# Patient Record
Sex: MALE | Race: WHITE
[De-identification: names, ages, dates, MRNs, and addresses within clinical notes are randomized per-mention and may not be internally consistent; named-entity substitution may affect disease eponyms.]

---

## 2018-03-13 ENCOUNTER — HOSPITAL ENCOUNTER (OUTPATIENT)
Dept: HOSPITAL 53 - M RAD | Age: 58
End: 2018-03-13
Attending: PHYSICIAN ASSISTANT
Payer: COMMERCIAL

## 2018-03-13 DIAGNOSIS — M25.571: Primary | ICD-10-CM

## 2018-07-05 ENCOUNTER — HOSPITAL ENCOUNTER (OUTPATIENT)
Dept: HOSPITAL 53 - M LAB REF | Age: 58
End: 2018-07-05
Attending: PHYSICIAN ASSISTANT
Payer: COMMERCIAL

## 2018-07-05 DIAGNOSIS — M79.673: Primary | ICD-10-CM

## 2018-07-05 LAB
ALBUMIN/GLOBULIN RATIO: 1.09 (ref 1–1.93)
ALBUMIN: 3.7 GM/DL (ref 3.2–5.2)
ALKALINE PHOSPHATASE: 130 U/L (ref 45–117)
ALT SERPL W P-5'-P-CCNC: 27 U/L (ref 12–78)
ANION GAP: 10 MEQ/L (ref 8–16)
AST SERPL-CCNC: 18 U/L (ref 7–37)
BASO #: 0 10^3/UL (ref 0–0.2)
BASO %: 0.5 % (ref 0–1)
BILIRUBIN,TOTAL: 0.8 MG/DL (ref 0.2–1)
BLOOD UREA NITROGEN: 19 MG/DL (ref 7–18)
CALCIUM LEVEL: 9.2 MG/DL (ref 8.5–10.1)
CARBON DIOXIDE LEVEL: 23 MEQ/L (ref 21–32)
CHLORIDE LEVEL: 109 MEQ/L (ref 98–107)
CREATININE FOR GFR: 1.48 MG/DL (ref 0.7–1.3)
EOS #: 0.3 10^3/UL (ref 0–0.5)
EOSINOPHIL NFR BLD AUTO: 3.4 % (ref 0–3)
GFR SERPL CREATININE-BSD FRML MDRD: 52.5 ML/MIN/{1.73_M2} (ref 56–?)
GLUCOSE, FASTING: 92 MG/DL (ref 70–100)
HEMATOCRIT: 40.6 % (ref 42–52)
HEMOGLOBIN: 14.2 G/DL (ref 13.5–17.5)
IMMATURE GRANULOCYTE %: 0.4 % (ref 0–3)
LYMPH #: 2.1 10^3/UL (ref 1.5–4.5)
LYMPH %: 24.8 % (ref 24–44)
MEAN CORPUSCULAR HEMOGLOBIN: 35.1 PG (ref 27–33)
MEAN CORPUSCULAR HGB CONC: 35 G/DL (ref 32–36.5)
MEAN CORPUSCULAR VOLUME: 100.5 FL (ref 80–96)
MONO #: 0.7 10^3/UL (ref 0–0.8)
MONO %: 8.5 % (ref 0–5)
NEUTROPHILS #: 5.2 10^3/UL (ref 1.8–7.7)
NEUTROPHILS %: 62.4 % (ref 36–66)
NRBC BLD AUTO-RTO: 0 % (ref 0–0)
PLATELET COUNT, AUTOMATED: 278 10^3/UL (ref 150–450)
POTASSIUM SERUM: 4.2 MEQ/L (ref 3.5–5.1)
RED BLOOD COUNT: 4.04 10^6/UL (ref 4.3–6.1)
RED CELL DISTRIBUTION WIDTH: 12.1 % (ref 11.5–14.5)
SODIUM LEVEL: 142 MEQ/L (ref 136–145)
TOTAL PROTEIN: 7.1 GM/DL (ref 6.4–8.2)
WHITE BLOOD COUNT: 8.3 10^3/UL (ref 4–10)

## 2018-07-11 ENCOUNTER — HOSPITAL ENCOUNTER (OUTPATIENT)
Dept: HOSPITAL 53 - M LAB REF | Age: 58
End: 2018-07-11
Attending: INTERNAL MEDICINE
Payer: COMMERCIAL

## 2018-07-11 DIAGNOSIS — R63.4: ICD-10-CM

## 2018-07-11 DIAGNOSIS — N18.3: Primary | ICD-10-CM

## 2018-07-11 DIAGNOSIS — M25.511: ICD-10-CM

## 2018-07-11 DIAGNOSIS — M79.671: ICD-10-CM

## 2018-07-11 DIAGNOSIS — K21.9: ICD-10-CM

## 2018-07-11 DIAGNOSIS — R73.09: ICD-10-CM

## 2018-07-11 LAB
APPEARANCE, URINE: CLEAR
BACTERIA UR QL AUTO: NEGATIVE
BILIRUBIN, URINE AUTO: NEGATIVE
BLOOD, URINE BLOOD: NEGATIVE
GLUCOSE, URINE (UA) AUTO: NEGATIVE MG/DL
KETONE, URINE AUTO: NEGATIVE MG/DL
LEUKOCYTE ESTERASE UR QL STRIP.AUTO: NEGATIVE
MUCUS, URINE: (no result)
NITRITE, URINE AUTO: NEGATIVE
PH,URINE: 6 UNITS (ref 5–9)
PROT UR QL STRIP.AUTO: NEGATIVE MG/DL
RBC, URINE AUTO: 1 /HPF (ref 0–3)
SPECIFIC GRAVITY URINE AUTO: 1.02 (ref 1–1.03)
SQUAMOUS #/AREA URNS AUTO: 0 /HPF (ref 0–6)
URIC ACID: 7.3 MG/DL (ref 3.5–7.2)
UROBILINOGEN, URINE AUTO: 0.2 MG/DL (ref 0–2)
VITAMIN B12 LEVEL: 403 PG/ML (ref 247–911)
WBC, URINE AUTO: 1 /HPF (ref 0–3)

## 2018-07-11 PROCEDURE — 84550 ASSAY OF BLOOD/URIC ACID: CPT

## 2018-07-12 LAB
CRP SERPL-MCNC: 0.6 MG/DL (ref 0–0.3)
PHOSPHORUS LEVEL: 3.1 MG/DL (ref 2.5–4.9)
RHEUMATOID FACTOR QUANT: < 10 IU/ML (ref ?–15)

## 2018-07-30 ENCOUNTER — HOSPITAL ENCOUNTER (OUTPATIENT)
Dept: HOSPITAL 53 - M LAB REF | Age: 58
End: 2018-07-30
Attending: INTERNAL MEDICINE
Payer: COMMERCIAL

## 2018-07-30 DIAGNOSIS — M25.511: ICD-10-CM

## 2018-07-30 DIAGNOSIS — M79.671: Primary | ICD-10-CM

## 2018-07-30 LAB
CRP SERPL-MCNC: 0.33 MG/DL (ref 0–0.3)
URIC ACID: 7.9 MG/DL (ref 3.5–7.2)

## 2018-07-30 PROCEDURE — 84550 ASSAY OF BLOOD/URIC ACID: CPT

## 2018-09-05 ENCOUNTER — HOSPITAL ENCOUNTER (OUTPATIENT)
Dept: HOSPITAL 53 - M RAD | Age: 58
End: 2018-09-05
Attending: INTERNAL MEDICINE
Payer: COMMERCIAL

## 2018-09-05 DIAGNOSIS — N18.3: ICD-10-CM

## 2018-09-05 DIAGNOSIS — I15.0: Primary | ICD-10-CM

## 2018-09-05 PROCEDURE — 78707 K FLOW/FUNCT IMAGE W/O DRUG: CPT

## 2018-10-09 ENCOUNTER — HOSPITAL ENCOUNTER (OUTPATIENT)
Dept: HOSPITAL 53 - M LAB REF | Age: 58
End: 2018-10-09
Attending: INTERNAL MEDICINE
Payer: COMMERCIAL

## 2018-10-09 DIAGNOSIS — M79.671: Primary | ICD-10-CM

## 2018-10-09 DIAGNOSIS — M25.511: ICD-10-CM

## 2018-10-09 LAB — URIC ACID: 8.8 MG/DL (ref 3.5–7.2)

## 2018-10-09 PROCEDURE — 84550 ASSAY OF BLOOD/URIC ACID: CPT

## 2018-11-27 ENCOUNTER — HOSPITAL ENCOUNTER (OUTPATIENT)
Dept: HOSPITAL 53 - M LAB REF | Age: 58
End: 2018-11-27
Attending: INTERNAL MEDICINE
Payer: COMMERCIAL

## 2018-11-27 DIAGNOSIS — M79.671: ICD-10-CM

## 2018-11-27 DIAGNOSIS — M25.511: Primary | ICD-10-CM

## 2018-11-27 LAB — URIC ACID: 8.2 MG/DL (ref 3.5–7.2)

## 2018-11-27 PROCEDURE — 84550 ASSAY OF BLOOD/URIC ACID: CPT

## 2018-11-29 ENCOUNTER — HOSPITAL ENCOUNTER (OUTPATIENT)
Dept: HOSPITAL 53 - M RAD | Age: 58
End: 2018-11-29
Attending: INTERNAL MEDICINE
Payer: COMMERCIAL

## 2018-11-29 DIAGNOSIS — I70.1: Primary | ICD-10-CM

## 2019-02-23 ENCOUNTER — HOSPITAL ENCOUNTER (OUTPATIENT)
Dept: HOSPITAL 53 - M RAD | Age: 59
End: 2019-02-23
Attending: INTERNAL MEDICINE
Payer: COMMERCIAL

## 2019-02-23 DIAGNOSIS — M54.5: Primary | ICD-10-CM

## 2019-02-23 NOTE — REP
The lumbar spine five views:

Comparison is 12/24/2016.

There are pedicle screws and stabilization rods spanning the L4 - L5-S1 levels.

This is unchanged.

There is no spondylolisthesis.

There is opacity within the L4-5 disc space, possibly an intervertebral disc

spacer.

There is disc space narrowing throughout the lumbar spine, most advanced at L4-5

and L5 S1 compatible with multilevel degenerative disc disease, not significantly

changed.

The sacroiliac articulations are unremarkable.

Impression:

Postsurgical changes as described.

Degenerative disc disease as described.

No significant interval change.

 

 

Electronically Signed by

Attila Hussein MD 02/23/2019 10:56 A

## 2019-03-27 ENCOUNTER — HOSPITAL ENCOUNTER (OUTPATIENT)
Dept: HOSPITAL 53 - M LAB REF | Age: 59
End: 2019-03-27
Attending: INTERNAL MEDICINE
Payer: COMMERCIAL

## 2019-03-27 DIAGNOSIS — M25.511: ICD-10-CM

## 2019-03-27 DIAGNOSIS — M79.671: Primary | ICD-10-CM

## 2019-04-30 ENCOUNTER — HOSPITAL ENCOUNTER (OUTPATIENT)
Dept: HOSPITAL 53 - M SMT | Age: 59
End: 2019-04-30
Attending: INTERNAL MEDICINE

## 2019-04-30 DIAGNOSIS — Z98.1: ICD-10-CM

## 2019-04-30 DIAGNOSIS — M51.9: Primary | ICD-10-CM

## 2019-05-01 NOTE — REP
Clinical:  Pain and disability.

 

Technique:  AP, lateral, coned-down views of the lumbosacral spine.

 

Comparison:  02/23/2019.

 

Findings:

The patient is again noted to be status post posterior fixation at L4-S1.

Alignment is stable and relatively maintained.  Multilevel degenerative changes

include endplate sclerosis, osteophyte formation, hypertrophic facet changes are

again noted and essentially stable. No acute fracture / compression injury or

subluxation.

 

Impression:

Stable appearance to L4-S1 fixation.

Multilevel degenerative changes again noted and relatively stable.

 

 

 

 

Electronically Signed by

Cheng Luna MD 05/01/2019 01:21 A

## 2019-07-22 ENCOUNTER — HOSPITAL ENCOUNTER (OUTPATIENT)
Dept: HOSPITAL 53 - M LAB REF | Age: 59
End: 2019-07-22
Attending: INTERNAL MEDICINE
Payer: COMMERCIAL

## 2019-07-22 DIAGNOSIS — M79.671: Primary | ICD-10-CM

## 2019-11-11 ENCOUNTER — HOSPITAL ENCOUNTER (OUTPATIENT)
Dept: HOSPITAL 53 - M LAB REF | Age: 59
End: 2019-11-11
Attending: INTERNAL MEDICINE
Payer: COMMERCIAL

## 2019-11-11 DIAGNOSIS — M10.9: Primary | ICD-10-CM

## 2020-03-03 ENCOUNTER — HOSPITAL ENCOUNTER (OUTPATIENT)
Dept: HOSPITAL 53 - M LAB REF | Age: 60
End: 2020-03-03
Attending: INTERNAL MEDICINE
Payer: MEDICARE

## 2020-03-03 DIAGNOSIS — M10.9: Primary | ICD-10-CM

## 2020-03-03 DIAGNOSIS — N18.3: ICD-10-CM

## 2020-09-08 ENCOUNTER — HOSPITAL ENCOUNTER (OUTPATIENT)
Dept: HOSPITAL 53 - M LAB REF | Age: 60
End: 2020-09-08
Attending: INTERNAL MEDICINE
Payer: MEDICARE

## 2020-09-08 DIAGNOSIS — M10.9: Primary | ICD-10-CM

## 2020-12-23 ENCOUNTER — HOSPITAL ENCOUNTER (OUTPATIENT)
Dept: HOSPITAL 53 - M WUC | Age: 60
End: 2020-12-23
Attending: PHYSICIAN ASSISTANT
Payer: MEDICARE

## 2020-12-23 DIAGNOSIS — S93.602A: ICD-10-CM
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
## 2020-12-23 DIAGNOSIS — S93.402A: Primary | ICD-10-CM

## 2020-12-23 DIAGNOSIS — M19.072: ICD-10-CM

## 2020-12-23 NOTE — REP
INDICATION:

SPRAIN OF LEFT ANKLE AND FOOT



COMPARISON:

None.



TECHNIQUE:

AP, lateral, bilateral oblique views.



FINDINGS:

Generalized age-related changes are appreciated as well as evidence for old healed

fractures primarily involving the lateral malleolus.  No obvious acute fracture or

dislocation identified.  Ankle mortise appears intact.



IMPRESSION:

Generalized age-related degenerative changes and posttraumatic changes related to old

injury.  No obvious acute fracture or dislocation.





<Electronically signed by Cheng Luna > 12/23/20 0970

## 2020-12-23 NOTE — REP
INDICATION:

SPRAIN OF LEFT ANKLE AND FOOT



COMPARISON:

None.



TECHNIQUE:

AP, lateral, bilateral oblique views left foot.



FINDINGS:

Generalized age-related changes are appreciated.  No obvious acute fracture or

dislocation.  No subcutaneous emphysema or foreign body.



IMPRESSION:

Generalized age-related changes.  No acute fracture or dislocation.





<Electronically signed by Cheng Luna > 12/23/20 0920

## 2021-03-09 ENCOUNTER — HOSPITAL ENCOUNTER (OUTPATIENT)
Dept: HOSPITAL 53 - M LAB REF | Age: 61
End: 2021-03-09
Attending: INTERNAL MEDICINE
Payer: MEDICARE

## 2021-03-09 DIAGNOSIS — M10.9: Primary | ICD-10-CM

## 2021-04-21 ENCOUNTER — HOSPITAL ENCOUNTER (OUTPATIENT)
Dept: HOSPITAL 53 - M LAB REF | Age: 61
End: 2021-04-21
Attending: INTERNAL MEDICINE
Payer: MEDICARE

## 2021-04-21 DIAGNOSIS — M10.9: Primary | ICD-10-CM

## 2021-07-03 ENCOUNTER — HOSPITAL ENCOUNTER (EMERGENCY)
Dept: HOSPITAL 53 - M ED | Age: 61
LOS: 1 days | Discharge: HOME | End: 2021-07-04
Payer: MEDICARE

## 2021-07-03 VITALS — SYSTOLIC BLOOD PRESSURE: 152 MMHG | DIASTOLIC BLOOD PRESSURE: 81 MMHG

## 2021-07-03 VITALS — WEIGHT: 250.53 LBS | HEIGHT: 75 IN | BODY MASS INDEX: 31.15 KG/M2

## 2021-07-03 DIAGNOSIS — E78.5: ICD-10-CM

## 2021-07-03 DIAGNOSIS — R07.9: Primary | ICD-10-CM

## 2021-07-03 DIAGNOSIS — Z79.899: ICD-10-CM

## 2021-07-03 DIAGNOSIS — I10: ICD-10-CM

## 2021-07-03 DIAGNOSIS — R06.02: ICD-10-CM

## 2021-07-03 DIAGNOSIS — Z87.891: ICD-10-CM

## 2021-07-03 LAB
ALBUMIN SERPL BCG-MCNC: 3 GM/DL (ref 3.2–5.2)
ALT SERPL W P-5'-P-CCNC: 41 U/L (ref 12–78)
APTT BLD: 23.2 SECONDS (ref 24.2–38.5)
BASOPHILS # BLD AUTO: 0.1 10^3/UL (ref 0–0.2)
BASOPHILS NFR BLD AUTO: 0.6 % (ref 0–1)
BILIRUB CONJ SERPL-MCNC: 0.2 MG/DL (ref 0–0.2)
BILIRUB SERPL-MCNC: 0.6 MG/DL (ref 0.2–1)
BUN SERPL-MCNC: 19 MG/DL (ref 7–18)
CALCIUM SERPL-MCNC: 7 MG/DL (ref 8.5–10.1)
CHLORIDE SERPL-SCNC: 116 MEQ/L (ref 98–107)
CK MB CFR.DF SERPL CALC: 1.33
CK MB SERPL-MCNC: 2.8 NG/ML (ref ?–3.6)
CK SERPL-CCNC: 210 U/L (ref 39–308)
CO2 SERPL-SCNC: 20 MEQ/L (ref 21–32)
CREAT SERPL-MCNC: 1.33 MG/DL (ref 0.7–1.3)
EOSINOPHIL # BLD AUTO: 0.3 10^3/UL (ref 0–0.5)
EOSINOPHIL NFR BLD AUTO: 3 % (ref 0–3)
GFR SERPL CREATININE-BSD FRML MDRD: 58.8 ML/MIN/{1.73_M2} (ref 56–?)
GLUCOSE SERPL-MCNC: 98 MG/DL (ref 70–100)
HCT VFR BLD AUTO: 46.2 % (ref 42–52)
HGB BLD-MCNC: 15.9 G/DL (ref 13.5–17.5)
INR PPP: 0.97
LIPASE SERPL-CCNC: 108 U/L (ref 73–393)
LYMPHOCYTES # BLD AUTO: 3.5 10^3/UL (ref 1.5–5)
LYMPHOCYTES NFR BLD AUTO: 32.7 % (ref 24–44)
MCH RBC QN AUTO: 34.2 PG (ref 27–33)
MCHC RBC AUTO-ENTMCNC: 34.4 G/DL (ref 32–36.5)
MCV RBC AUTO: 99.4 FL (ref 80–96)
MONOCYTES # BLD AUTO: 0.9 10^3/UL (ref 0–0.8)
MONOCYTES NFR BLD AUTO: 8 % (ref 2–8)
NEUTROPHILS # BLD AUTO: 6 10^3/UL (ref 1.5–8.5)
NEUTROPHILS NFR BLD AUTO: 55.3 % (ref 36–66)
NT-PRO BNP: 11 PG/ML (ref ?–125)
PLATELET # BLD AUTO: 285 10^3/UL (ref 150–450)
POTASSIUM SERPL-SCNC: 3.4 MEQ/L (ref 3.5–5.1)
PROT SERPL-MCNC: 5.6 GM/DL (ref 6.4–8.2)
PROTHROMBIN TIME: 13.1 SECONDS (ref 12.5–14.3)
RBC # BLD AUTO: 4.65 10^6/UL (ref 4.3–6.1)
SODIUM SERPL-SCNC: 146 MEQ/L (ref 136–145)
T4 FREE SERPL-MCNC: 0.85 NG/DL (ref 0.76–1.46)
TROPONIN I SERPL-MCNC: < 0.02 NG/ML (ref ?–0.1)
TSH SERPL DL<=0.005 MIU/L-ACNC: 1.92 UIU/ML (ref 0.36–3.74)
WBC # BLD AUTO: 10.8 10^3/UL (ref 4–10)

## 2021-07-03 PROCEDURE — 96361 HYDRATE IV INFUSION ADD-ON: CPT

## 2021-07-03 PROCEDURE — 85025 COMPLETE CBC W/AUTO DIFF WBC: CPT

## 2021-07-03 PROCEDURE — 84443 ASSAY THYROID STIM HORMONE: CPT

## 2021-07-03 PROCEDURE — 71275 CT ANGIOGRAPHY CHEST: CPT

## 2021-07-03 PROCEDURE — 82550 ASSAY OF CK (CPK): CPT

## 2021-07-03 PROCEDURE — 84439 ASSAY OF FREE THYROXINE: CPT

## 2021-07-03 PROCEDURE — 71045 X-RAY EXAM CHEST 1 VIEW: CPT

## 2021-07-03 PROCEDURE — 99285 EMERGENCY DEPT VISIT HI MDM: CPT

## 2021-07-03 PROCEDURE — 80076 HEPATIC FUNCTION PANEL: CPT

## 2021-07-03 PROCEDURE — 85610 PROTHROMBIN TIME: CPT

## 2021-07-03 PROCEDURE — 93041 RHYTHM ECG TRACING: CPT

## 2021-07-03 PROCEDURE — 82553 CREATINE MB FRACTION: CPT

## 2021-07-03 PROCEDURE — 80048 BASIC METABOLIC PNL TOTAL CA: CPT

## 2021-07-03 PROCEDURE — 94760 N-INVAS EAR/PLS OXIMETRY 1: CPT

## 2021-07-03 PROCEDURE — 84484 ASSAY OF TROPONIN QUANT: CPT

## 2021-07-03 PROCEDURE — 96360 HYDRATION IV INFUSION INIT: CPT

## 2021-07-03 PROCEDURE — 85730 THROMBOPLASTIN TIME PARTIAL: CPT

## 2021-07-03 PROCEDURE — 83690 ASSAY OF LIPASE: CPT

## 2021-07-03 PROCEDURE — 93005 ELECTROCARDIOGRAM TRACING: CPT

## 2021-07-03 PROCEDURE — 83880 ASSAY OF NATRIURETIC PEPTIDE: CPT

## 2021-07-03 RX ADMIN — NITROGLYCERIN PRN MG: 0.4 TABLET SUBLINGUAL at 20:37

## 2021-07-03 NOTE — REP
INDICATION:

CHEST PAIN



COMPARISON:

None.



TECHNIQUE:

Portable AP view of the chest



FINDINGS:

The mediastinum and cardiac silhouette are within normal limits for portable

technique.  The lung fields are clear without acute consolidation, effusion, or

pneumothorax.  Skeletal structures are intact.



IMPRESSION:

No acute cardiopulmonary process appreciated.





<Electronically signed by Cheng Luna > 07/03/21 2029

## 2021-07-04 VITALS — DIASTOLIC BLOOD PRESSURE: 94 MMHG | SYSTOLIC BLOOD PRESSURE: 135 MMHG

## 2021-07-04 LAB
CK MB CFR.DF SERPL CALC: 1.58
CK MB SERPL-MCNC: 3.2 NG/ML (ref ?–3.6)
CK SERPL-CCNC: 202 U/L (ref 39–308)
TROPONIN I SERPL-MCNC: < 0.02 NG/ML (ref ?–0.1)

## 2021-07-04 RX ADMIN — NITROGLYCERIN PRN MG: 0.4 TABLET SUBLINGUAL at 04:53

## 2021-07-04 NOTE — ECGEPIP
[facility] - ED

                                       

                                       Test Date:    2021

Pat Name:     GOPI JOEL            Department:   

Patient ID:   [id number]                 Room:         -

Gender:       Male                     Technician:   ED

:          1960               Requested By: MORENA MERRILL

Order Number: [id number]     Reading MD:   Maja Lundborg-Gray

                                 Measurements

Intervals                              Axis          

Rate:         64                       P:            -1

MS:           182                      QRS:          -7

QRSD:         84                       T:            26

QT:           396                                    

QTc:          408                                    

                           Interpretive Statements

Normal sinus rhythm

Minimal voltage criteria for LVH, may be normal variant ( R in aVL )

leftward axis

Nonspecific ST T wave changes 

 

cw 7/3/21 rate decreased

Nonspecific ST T wave changes

Electronically Signed on 2021 18:12:20 EDT by Maja Lundborg-Gray

## 2021-07-04 NOTE — ECGEPIP
[facility] - ED

                                       

                                       Test Date:    2021

Pat Name:     GOPI JOEL            Department:   

Patient ID:   [id number]                 Room:         -

Gender:       Male                     Technician:   

:          1960               Requested By: MORENA MERRILL

Order Number: [id number]     Reading MD:   Maja Lundborg-Gray

                                 Measurements

Intervals                              Axis          

Rate:         83                       P:            35

CA:           150                      QRS:          -10

QRSD:         72                       T:            21

QT:           344                                    

QTc:          404                                    

                           Interpretive Statements

Normal sinus rhythm

leftward axis

Minimal voltage criteria for LVH, may be normal variant ( R in aVL )

Nonspecific T wave abnormality

No prior ECG for comparison

Electronically Signed on 2021 18:10:56 EDT by Maja Lundborg-Gray

## 2021-09-14 ENCOUNTER — HOSPITAL ENCOUNTER (OUTPATIENT)
Dept: HOSPITAL 53 - M LAB REF | Age: 61
End: 2021-09-14
Attending: INTERNAL MEDICINE
Payer: MEDICARE

## 2021-09-14 DIAGNOSIS — M10.9: Primary | ICD-10-CM

## 2022-10-11 ENCOUNTER — HOSPITAL ENCOUNTER (OUTPATIENT)
Dept: HOSPITAL 53 - M LAB REF | Age: 62
End: 2022-10-11
Attending: PHYSICIAN ASSISTANT
Payer: MEDICARE

## 2022-10-11 DIAGNOSIS — R30.0: ICD-10-CM

## 2022-10-11 DIAGNOSIS — M54.50: Primary | ICD-10-CM

## 2022-11-04 ENCOUNTER — HOSPITAL ENCOUNTER (OUTPATIENT)
Dept: HOSPITAL 53 - M RAD | Age: 62
End: 2022-11-04
Attending: PHYSICIAN ASSISTANT
Payer: MEDICARE

## 2022-11-04 DIAGNOSIS — N50.819: Primary | ICD-10-CM

## 2022-11-15 ENCOUNTER — HOSPITAL ENCOUNTER (OUTPATIENT)
Dept: HOSPITAL 53 - M LAB REF | Age: 62
End: 2022-11-15
Attending: INTERNAL MEDICINE
Payer: MEDICARE

## 2022-11-15 DIAGNOSIS — R74.8: Primary | ICD-10-CM

## 2023-05-11 ENCOUNTER — HOSPITAL ENCOUNTER (OUTPATIENT)
Dept: HOSPITAL 53 - M LAB REF | Age: 63
End: 2023-05-11
Attending: INTERNAL MEDICINE
Payer: MEDICARE

## 2023-05-11 DIAGNOSIS — M10.9: Primary | ICD-10-CM

## 2023-08-23 ENCOUNTER — HOSPITAL ENCOUNTER (EMERGENCY)
Dept: HOSPITAL 53 - M ED | Age: 63
LOS: 1 days | Discharge: HOME | End: 2023-08-24
Payer: MEDICARE

## 2023-08-23 VITALS — WEIGHT: 256.4 LBS | BODY MASS INDEX: 31.88 KG/M2 | HEIGHT: 75 IN

## 2023-08-23 VITALS — TEMPERATURE: 97.2 F

## 2023-08-23 DIAGNOSIS — R07.9: Primary | ICD-10-CM

## 2023-08-23 DIAGNOSIS — I10: ICD-10-CM

## 2023-08-23 DIAGNOSIS — Z88.8: ICD-10-CM

## 2023-08-23 DIAGNOSIS — Z79.899: ICD-10-CM

## 2023-08-23 DIAGNOSIS — N18.9: ICD-10-CM

## 2023-08-23 DIAGNOSIS — E78.5: ICD-10-CM

## 2023-08-23 DIAGNOSIS — Z87.891: ICD-10-CM

## 2023-08-23 LAB
BASOPHILS # BLD AUTO: 0.1 10^3/UL (ref 0–0.2)
BASOPHILS NFR BLD AUTO: 0.7 % (ref 0–1)
BUN SERPL-MCNC: 18 MG/DL (ref 9–23)
CALCIUM SERPL-MCNC: 9.6 MG/DL (ref 8.3–10.6)
CHLORIDE SERPL-SCNC: 106 MMOL/L (ref 98–107)
CK MB CFR.DF SERPL CALC: 0.81
CK MB CFR.DF SERPL CALC: 3.61
CK MB SERPL-MCNC: 1.6 NG/ML (ref ?–3.6)
CK MB SERPL-MCNC: 2.6 NG/ML (ref ?–3.6)
CK SERPL-CCNC: 196 U/L (ref 46–171)
CK SERPL-CCNC: 72 U/L (ref 46–171)
CO2 SERPL-SCNC: 28 MMOL/L (ref 20–31)
CREAT SERPL-MCNC: 1.91 MG/DL (ref 0.7–1.3)
EOSINOPHIL # BLD AUTO: 0.3 10^3/UL (ref 0–0.5)
EOSINOPHIL NFR BLD AUTO: 2.7 % (ref 0–3)
GFR SERPL CREATININE-BSD FRML MDRD: 38.3 ML/MIN/{1.73_M2} (ref 49–?)
GLUCOSE SERPL-MCNC: 95 MG/DL (ref 74–106)
HCT VFR BLD AUTO: 45.4 % (ref 42–52)
HGB BLD-MCNC: 15.8 G/DL (ref 13.5–17.5)
LYMPHOCYTES # BLD AUTO: 2.9 10^3/UL (ref 1.5–5)
LYMPHOCYTES NFR BLD AUTO: 27.7 % (ref 24–44)
MCH RBC QN AUTO: 34.9 PG (ref 27–33)
MCHC RBC AUTO-ENTMCNC: 34.8 G/DL (ref 32–36.5)
MCV RBC AUTO: 100.2 FL (ref 80–96)
MONOCYTES # BLD AUTO: 0.9 10^3/UL (ref 0–0.8)
MONOCYTES NFR BLD AUTO: 8.3 % (ref 2–8)
NEUTROPHILS # BLD AUTO: 6.3 10^3/UL (ref 1.5–8.5)
NEUTROPHILS NFR BLD AUTO: 60.3 % (ref 36–66)
PLATELET # BLD AUTO: 252 10^3/UL (ref 150–450)
POTASSIUM SERPL-SCNC: 4.6 MMOL/L (ref 3.5–5.1)
RBC # BLD AUTO: 4.53 10^6/UL (ref 4.3–6.1)
SODIUM SERPL-SCNC: 142 MMOL/L (ref 136–145)
WBC # BLD AUTO: 10.4 10^3/UL (ref 4–10)

## 2023-08-23 PROCEDURE — 93971 EXTREMITY STUDY: CPT

## 2023-08-23 PROCEDURE — 80076 HEPATIC FUNCTION PANEL: CPT

## 2023-08-23 PROCEDURE — 99284 EMERGENCY DEPT VISIT MOD MDM: CPT

## 2023-08-23 PROCEDURE — 36415 COLL VENOUS BLD VENIPUNCTURE: CPT

## 2023-08-23 PROCEDURE — 93041 RHYTHM ECG TRACING: CPT

## 2023-08-23 PROCEDURE — 71046 X-RAY EXAM CHEST 2 VIEWS: CPT

## 2023-08-23 PROCEDURE — 94760 N-INVAS EAR/PLS OXIMETRY 1: CPT

## 2023-08-23 PROCEDURE — 85025 COMPLETE CBC W/AUTO DIFF WBC: CPT

## 2023-08-23 PROCEDURE — 87486 CHLMYD PNEUM DNA AMP PROBE: CPT

## 2023-08-23 PROCEDURE — 93005 ELECTROCARDIOGRAM TRACING: CPT

## 2023-08-23 PROCEDURE — 82553 CREATINE MB FRACTION: CPT

## 2023-08-23 PROCEDURE — 80048 BASIC METABOLIC PNL TOTAL CA: CPT

## 2023-08-23 PROCEDURE — 82550 ASSAY OF CK (CPK): CPT

## 2023-08-23 PROCEDURE — 83735 ASSAY OF MAGNESIUM: CPT

## 2023-08-23 PROCEDURE — 87633 RESP VIRUS 12-25 TARGETS: CPT

## 2023-08-23 PROCEDURE — 83690 ASSAY OF LIPASE: CPT

## 2023-08-23 PROCEDURE — 84484 ASSAY OF TROPONIN QUANT: CPT

## 2023-08-23 PROCEDURE — 87798 DETECT AGENT NOS DNA AMP: CPT

## 2023-08-23 PROCEDURE — 87581 M.PNEUMON DNA AMP PROBE: CPT

## 2023-08-24 VITALS — OXYGEN SATURATION: 96 % | DIASTOLIC BLOOD PRESSURE: 98 MMHG | SYSTOLIC BLOOD PRESSURE: 148 MMHG

## 2023-08-24 LAB
ALBUMIN SERPL BCG-MCNC: 3.9 G/DL (ref 3.2–5.2)
ALP SERPL-CCNC: 86 U/L (ref 46–116)
ALT SERPL W P-5'-P-CCNC: 30 U/L (ref 7–40)
AST SERPL-CCNC: 17 U/L (ref ?–34)
BILIRUB CONJ SERPL-MCNC: 0.4 MG/DL (ref ?–0.4)
BILIRUB SERPL-MCNC: 1.4 MG/DL (ref 0.3–1.2)
CK MB CFR.DF SERPL CALC: 0.76
CK MB SERPL-MCNC: 1.4 NG/ML (ref ?–3.6)
CK SERPL-CCNC: 184 U/L (ref 46–171)
LIPASE SERPL-CCNC: 32 U/L (ref 12–53)
MAGNESIUM SERPL-MCNC: 1.8 MG/DL (ref 1.8–2.4)
PROT SERPL-MCNC: 6.9 G/DL (ref 5.7–8.2)

## 2024-01-17 ENCOUNTER — HOSPITAL ENCOUNTER (OUTPATIENT)
Dept: HOSPITAL 53 - M RAD | Age: 64
End: 2024-01-17
Attending: PHYSICIAN ASSISTANT
Payer: MEDICARE

## 2024-01-17 DIAGNOSIS — N50.819: Primary | ICD-10-CM

## 2024-05-13 ENCOUNTER — HOSPITAL ENCOUNTER (OUTPATIENT)
Dept: HOSPITAL 53 - M LAB REF | Age: 64
End: 2024-05-13
Attending: INTERNAL MEDICINE
Payer: MEDICARE

## 2024-05-13 DIAGNOSIS — M10.9: Primary | ICD-10-CM
